# Patient Record
Sex: MALE | Race: WHITE | Employment: FULL TIME | URBAN - METROPOLITAN AREA
[De-identification: names, ages, dates, MRNs, and addresses within clinical notes are randomized per-mention and may not be internally consistent; named-entity substitution may affect disease eponyms.]

---

## 2017-12-01 ENCOUNTER — EKG ENCOUNTER (OUTPATIENT)
Dept: LAB | Age: 32
End: 2017-12-01
Attending: PHYSICIAN ASSISTANT
Payer: COMMERCIAL

## 2017-12-01 PROCEDURE — 93005 ELECTROCARDIOGRAM TRACING: CPT

## 2017-12-01 PROCEDURE — 93010 ELECTROCARDIOGRAM REPORT: CPT | Performed by: INTERNAL MEDICINE

## 2020-06-17 ENCOUNTER — OFFICE VISIT (OUTPATIENT)
Dept: FAMILY MEDICINE CLINIC | Facility: CLINIC | Age: 35
End: 2020-06-17
Payer: COMMERCIAL

## 2020-06-17 VITALS
HEIGHT: 72 IN | WEIGHT: 169.81 LBS | SYSTOLIC BLOOD PRESSURE: 160 MMHG | RESPIRATION RATE: 16 BRPM | HEART RATE: 83 BPM | TEMPERATURE: 98 F | DIASTOLIC BLOOD PRESSURE: 80 MMHG | BODY MASS INDEX: 23 KG/M2

## 2020-06-17 DIAGNOSIS — Z23 NEED FOR DIPHTHERIA-TETANUS-PERTUSSIS (TDAP) VACCINE: ICD-10-CM

## 2020-06-17 DIAGNOSIS — Z00.00 LABORATORY EXAM ORDERED AS PART OF ROUTINE GENERAL MEDICAL EXAMINATION: ICD-10-CM

## 2020-06-17 DIAGNOSIS — R56.9 ALCOHOL WITHDRAWAL SEIZURE WITHOUT COMPLICATION (HCC): ICD-10-CM

## 2020-06-17 DIAGNOSIS — F10.21 ALCOHOLISM IN REMISSION (HCC): ICD-10-CM

## 2020-06-17 DIAGNOSIS — Z00.00 ANNUAL PHYSICAL EXAM: Primary | ICD-10-CM

## 2020-06-17 DIAGNOSIS — F10.230 ALCOHOL WITHDRAWAL SEIZURE WITHOUT COMPLICATION (HCC): ICD-10-CM

## 2020-06-17 PROCEDURE — 99385 PREV VISIT NEW AGE 18-39: CPT | Performed by: FAMILY MEDICINE

## 2020-06-17 RX ORDER — LISDEXAMFETAMINE DIMESYLATE 30 MG/1
30 CAPSULE ORAL DAILY
COMMUNITY
Start: 2020-05-21

## 2020-06-17 NOTE — PROGRESS NOTES
Patient presents with:  Physical: Annual.   Lump: on Spine, tingling, on R side, feels inflammed.    Alcohol Problem: May pt had a seizure from alcohol withdrawl     HPI:   Timur Doevr is a 37year old male who presents for a complete physical exam. He History:  Social History    Tobacco Use      Smoking status: Never Smoker      Smokeless tobacco: Never Used    Alcohol use: Yes      Frequency: 4 or more times a week      Drinks per session: 10 or more      Binge frequency: Monthly      Comment: 10-15 : poor health when drinking. Need to cut alcohol out of life. New baby on the way, he believes this will motivate him to clean his act up. Will check labs. Lump on back benign. Cyst vs MSK vs lipoma.   Most likely lipoma based on feeling, and size and

## 2020-06-19 ENCOUNTER — LAB ENCOUNTER (OUTPATIENT)
Dept: LAB | Age: 35
End: 2020-06-19
Attending: FAMILY MEDICINE
Payer: COMMERCIAL

## 2020-06-19 DIAGNOSIS — F10.21 ALCOHOLISM IN REMISSION (HCC): ICD-10-CM

## 2020-06-19 DIAGNOSIS — Z00.00 LABORATORY EXAM ORDERED AS PART OF ROUTINE GENERAL MEDICAL EXAMINATION: ICD-10-CM

## 2020-06-19 PROCEDURE — 80050 GENERAL HEALTH PANEL: CPT | Performed by: FAMILY MEDICINE

## 2020-06-19 PROCEDURE — 84425 ASSAY OF VITAMIN B-1: CPT | Performed by: FAMILY MEDICINE

## 2020-06-19 PROCEDURE — 80061 LIPID PANEL: CPT | Performed by: FAMILY MEDICINE

## 2021-04-09 ENCOUNTER — TELEPHONE (OUTPATIENT)
Dept: FAMILY MEDICINE CLINIC | Facility: CLINIC | Age: 36
End: 2021-04-09

## 2021-04-09 NOTE — TELEPHONE ENCOUNTER
Called patient he is alcoholic and is going through withdrawals last drink was 20 min ago feels he needs seizure meds and aniexity medication.  He wants to talk to Dr Aashish Raygoza about this and to get medication

## 2021-04-09 NOTE — TELEPHONE ENCOUNTER
Pt is calling he would like to speak with Dr. Selin Goyal about personal stuff. He is looking for some personal advice he would not share.
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
## 2021-04-11 NOTE — ED PROVIDER NOTES
Patient Seen in: [facility] Emergency Department      History   Patient presents with:  Eval-D  Seizure Disorder    Stated Complaint: eval d, sz last night, last drink 40 hours ago    HPI/Subjective:   HPI    This is a pleasant 52-year-old male who Physical Exam  General: This a pleasant alert and oriented x3 patient. He does have some tremors noted. HEENT: Pupils are equal reactive to light. Extra ocular motions are intact.   No scleral icterus or conjunctival pallor: Tongue is midline and viral infection consistent with COVID-19 by their healthcare provider. Signs and symptoms of respiratory viral infection due to SARS-CoV-2, influenza, and RSV can be similar.     Test performed using the [employer] Xpress SARS-CoV-2/FLU/RSV assay on the [facility] The patient said he wished to contact his wife to discuss options. The patient did not wish to stay in the hospital or do any inpatient detox at this time. I will write Librium for the patient for home.   Told him to return immediately for any return o

## 2021-04-11 NOTE — PROGRESS NOTES
04/11/21 1420   COVID Exposure Risk Screening   Have you been practicing social distancing? Yes   Have you been wearing a mask when in the community? Yes   Are the people you live with following social distancing and wearing a mask?  Yes   While you are

## 2021-04-11 NOTE — ED INITIAL ASSESSMENT (HPI)
PT here for ETOH Detox. Pt reports that his last drink was 40 hours ago. Pt also reports that he had a seizure last night. Pt stated that he went to [facility] for detox and they sent him here for medical clearance.

## 2021-06-30 ENCOUNTER — TELEPHONE (OUTPATIENT)
Dept: FAMILY MEDICINE CLINIC | Facility: CLINIC | Age: 36
End: 2021-06-30

## 2021-06-30 NOTE — TELEPHONE ENCOUNTER
Please enter lab orders for the patient's upcoming physical appointment. Physical scheduled:    Your appointments     Date & Time Appointment Department [facility])    Jul 14, 2021  3:00 PM CDT Physical - Established with Igor Kirby MD [facility]

## 2021-06-30 NOTE — TELEPHONE ENCOUNTER
Patient was seen in the ER 04/11/21. He is an alcoholic and is currently going through withdrawal again. Patient is going to meetings and has the support of his wife.  When patient was discharged they gave him a 15 pill prescription of chlordiazePOXIDE HCl

## 2021-06-30 NOTE — PROGRESS NOTES
Patient presents with:  Alcohol Problem: withdrawl    French Rhodes is a 38year old male who presents for had concerns including Alcohol Problem (withdrawl). Encounter done via video visit    Objective:   HPI:   H/o alcoholism.    H/o three seizures sin coming up on 1year-old. We will prescribe the chlordiazepoxide. Labs ordered for upcoming physical.    Reminded patient of the dangers of alcohol withdrawal even with medications.   If he feels having breakthrough symptoms or seizures or overwhelmed, go

## 2021-06-30 NOTE — TELEPHONE ENCOUNTER
I would need some sort of encounter, its a controlled med  If he can come in at end of day, or do virtual we could discuss it.
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
## 2021-07-27 ENCOUNTER — LAB ENCOUNTER (OUTPATIENT)
Dept: LAB | Age: 36
End: 2021-07-27
Attending: FAMILY MEDICINE
Payer: COMMERCIAL

## 2021-07-27 DIAGNOSIS — R56.9 ALCOHOL WITHDRAWAL SEIZURE WITHOUT COMPLICATION (HCC): ICD-10-CM

## 2021-07-27 DIAGNOSIS — F10.230 ALCOHOL WITHDRAWAL SEIZURE WITHOUT COMPLICATION (HCC): ICD-10-CM

## 2021-07-27 DIAGNOSIS — Z00.00 LABORATORY EXAM ORDERED AS PART OF ROUTINE GENERAL MEDICAL EXAMINATION: ICD-10-CM

## 2021-07-27 LAB
ALBUMIN SERPL-MCNC: 4.2 G/DL (ref 3.4–5)
ALBUMIN/GLOB SERPL: 1.2 {RATIO} (ref 1–2)
ALP LIVER SERPL-CCNC: 87 U/L
ALT SERPL-CCNC: 350 U/L
ANION GAP SERPL CALC-SCNC: 5 MMOL/L (ref 0–18)
AST SERPL-CCNC: 238 U/L (ref 15–37)
BASOPHILS # BLD AUTO: 0.08 X10(3) UL (ref 0–0.2)
BASOPHILS NFR BLD AUTO: 1.8 %
BILIRUB SERPL-MCNC: 0.7 MG/DL (ref 0.1–2)
BUN BLD-MCNC: 16 MG/DL (ref 7–18)
BUN/CREAT SERPL: 16.5 (ref 10–20)
CALCIUM BLD-MCNC: 9.1 MG/DL (ref 8.5–10.1)
CHLORIDE SERPL-SCNC: 104 MMOL/L (ref 98–112)
CHOLEST SMN-MCNC: 199 MG/DL (ref ?–200)
CO2 SERPL-SCNC: 27 MMOL/L (ref 21–32)
CREAT BLD-MCNC: 0.97 MG/DL
DEPRECATED RDW RBC AUTO: 46 FL (ref 35.1–46.3)
EOSINOPHIL # BLD AUTO: 0.1 X10(3) UL (ref 0–0.7)
EOSINOPHIL NFR BLD AUTO: 2.2 %
ERYTHROCYTE [DISTWIDTH] IN BLOOD BY AUTOMATED COUNT: 12.2 % (ref 11–15)
EST. AVERAGE GLUCOSE BLD GHB EST-MCNC: 103 MG/DL (ref 68–126)
GLOBULIN PLAS-MCNC: 3.4 G/DL (ref 2.8–4.4)
GLUCOSE BLD-MCNC: 90 MG/DL (ref 70–99)
HBA1C MFR BLD HPLC: 5.2 % (ref ?–5.7)
HCT VFR BLD AUTO: 45.1 %
HDLC SERPL-MCNC: 81 MG/DL (ref 40–59)
HGB BLD-MCNC: 14.4 G/DL
IMM GRANULOCYTES # BLD AUTO: 0.01 X10(3) UL (ref 0–1)
IMM GRANULOCYTES NFR BLD: 0.2 %
LDLC SERPL CALC-MCNC: 107 MG/DL (ref ?–100)
LYMPHOCYTES # BLD AUTO: 1.68 X10(3) UL (ref 1–4)
LYMPHOCYTES NFR BLD AUTO: 37.8 %
M PROTEIN MFR SERPL ELPH: 7.6 G/DL (ref 6.4–8.2)
MCH RBC QN AUTO: 32.1 PG (ref 26–34)
MCHC RBC AUTO-ENTMCNC: 31.9 G/DL (ref 31–37)
MCV RBC AUTO: 100.4 FL
MONOCYTES # BLD AUTO: 0.62 X10(3) UL (ref 0.1–1)
MONOCYTES NFR BLD AUTO: 13.9 %
NEUTROPHILS # BLD AUTO: 1.96 X10 (3) UL (ref 1.5–7.7)
NEUTROPHILS # BLD AUTO: 1.96 X10(3) UL (ref 1.5–7.7)
NEUTROPHILS NFR BLD AUTO: 44.1 %
NONHDLC SERPL-MCNC: 118 MG/DL (ref ?–130)
OSMOLALITY SERPL CALC.SUM OF ELEC: 283 MOSM/KG (ref 275–295)
PATIENT FASTING Y/N/NP: YES
PATIENT FASTING Y/N/NP: YES
PLATELET # BLD AUTO: 294 10(3)UL (ref 150–450)
POTASSIUM SERPL-SCNC: 4.1 MMOL/L (ref 3.5–5.1)
RBC # BLD AUTO: 4.49 X10(6)UL
SODIUM SERPL-SCNC: 136 MMOL/L (ref 136–145)
TRIGL SERPL-MCNC: 59 MG/DL (ref 30–149)
TSI SER-ACNC: 1.15 MIU/ML (ref 0.36–3.74)
VLDLC SERPL CALC-MCNC: 10 MG/DL (ref 0–30)
WBC # BLD AUTO: 4.5 X10(3) UL (ref 4–11)

## 2021-07-27 PROCEDURE — 80061 LIPID PANEL: CPT | Performed by: FAMILY MEDICINE

## 2021-07-27 PROCEDURE — 84425 ASSAY OF VITAMIN B-1: CPT | Performed by: FAMILY MEDICINE

## 2021-07-27 PROCEDURE — 80050 GENERAL HEALTH PANEL: CPT | Performed by: FAMILY MEDICINE

## 2021-07-27 PROCEDURE — 83036 HEMOGLOBIN GLYCOSYLATED A1C: CPT | Performed by: FAMILY MEDICINE

## 2021-07-30 ENCOUNTER — OFFICE VISIT (OUTPATIENT)
Dept: FAMILY MEDICINE CLINIC | Facility: CLINIC | Age: 36
End: 2021-07-30
Payer: COMMERCIAL

## 2021-07-30 VITALS
HEIGHT: 73 IN | BODY MASS INDEX: 22.8 KG/M2 | TEMPERATURE: 98 F | SYSTOLIC BLOOD PRESSURE: 132 MMHG | DIASTOLIC BLOOD PRESSURE: 88 MMHG | RESPIRATION RATE: 16 BRPM | WEIGHT: 172 LBS | HEART RATE: 84 BPM

## 2021-07-30 DIAGNOSIS — R79.89 ELEVATED LFTS: Primary | ICD-10-CM

## 2021-07-30 DIAGNOSIS — F10.230 ALCOHOL WITHDRAWAL SYNDROME WITHOUT COMPLICATION (HCC): ICD-10-CM

## 2021-07-30 PROCEDURE — 3075F SYST BP GE 130 - 139MM HG: CPT | Performed by: NURSE PRACTITIONER

## 2021-07-30 PROCEDURE — 99214 OFFICE O/P EST MOD 30 MIN: CPT | Performed by: NURSE PRACTITIONER

## 2021-07-30 PROCEDURE — 3008F BODY MASS INDEX DOCD: CPT | Performed by: NURSE PRACTITIONER

## 2021-07-30 PROCEDURE — 3079F DIAST BP 80-89 MM HG: CPT | Performed by: NURSE PRACTITIONER

## 2021-07-30 NOTE — PROGRESS NOTES
Patient presents with:  Lab Results: follow up on blood work       HPI:  Presents for follow up of visit on 7/23 where he was starting acute alcohol withdrawal an also for follow up of labs. Please see note from visit on 7/23 for more detail.  Patient refus or appreciable organomegaly. BS(+)x4- normoactive. Skin: Skin is warm and dry. No rash noted. No erythema. No pallor. Psych: Sitting calmly in exam room, interacting appropriately with examiner. Dressed appropriately for the weather.      A/P:    Elevate

## 2021-08-01 LAB — VITAMIN B1 (THIAMINE), WHOLE B: 173 NMOL/L

## 2021-11-14 ENCOUNTER — IMMUNIZATION (OUTPATIENT)
Dept: LAB | Facility: HOSPITAL | Age: 36
End: 2021-11-14
Attending: EMERGENCY MEDICINE
Payer: COMMERCIAL

## 2021-11-14 DIAGNOSIS — Z23 NEED FOR VACCINATION: Primary | ICD-10-CM

## 2021-11-14 PROCEDURE — 0001A SARSCOV2 VAC 30MCG/0.3ML IM: CPT

## 2022-06-08 ENCOUNTER — TELEPHONE (OUTPATIENT)
Dept: FAMILY MEDICINE CLINIC | Facility: CLINIC | Age: 37
End: 2022-06-08

## 2022-06-08 NOTE — TELEPHONE ENCOUNTER
Spoke with patient. He reports he is an alcoholic and is currently going through detox. Had consumed 20 beers last night. Can still feel it in his system but he is calming down. Does have the shakes and is very anxious. He has had seizures in the past from withdrawals and does not want this to occur again. Requesting medication that was last ordered for this in July from [facility] as it did help him before. Advised that if he ever felt his symptoms were an emergency he was to go to the ER for evaluation. [facility] is not currently in office to request medication and did advise patient that it is unlikely this would be refilled although he has an upcoming appointment. Patient asks what he is supposed to do now to save his life? This RN strongly suggested he go to the ER and to follow up with this office as scheduled. Then he asks who he should call to save his life and I instructed him to call 911. Patient did sound anxious but had been speaking clearly during the call. Patient did sound frustrated that this office could not provide the medication immediately to him. Call was ended by patient at the time.

## 2022-06-08 NOTE — TELEPHONE ENCOUNTER
Patient is requesting the medication chlordiazepoxide HCI 25 mg please send to [facility] patient made a physical appt with Dr. Doris Toribio on 6/21/22he keeps stating he speaks with Nurse Sofia Quinn to get this done and this is a borderline emergency.

## 2023-06-28 ENCOUNTER — TELEPHONE (OUTPATIENT)
Dept: FAMILY MEDICINE CLINIC | Facility: CLINIC | Age: 38
End: 2023-06-28

## 2023-06-28 RX ORDER — ONDANSETRON 4 MG/1
1 TABLET, FILM COATED ORAL EVERY 6 HOURS PRN
COMMUNITY
Start: 2022-12-31

## 2023-06-28 RX ORDER — LORAZEPAM 1 MG/1
1 TABLET ORAL EVERY 4 HOURS PRN
COMMUNITY
Start: 2022-12-31

## 2023-06-28 RX ORDER — CHLORDIAZEPOXIDE HYDROCHLORIDE 10 MG/1
10 CAPSULE, GELATIN COATED ORAL 3 TIMES DAILY PRN
COMMUNITY
Start: 2021-12-14

## 2023-06-28 RX ORDER — FAMOTIDINE 20 MG/1
20 TABLET, FILM COATED ORAL 2 TIMES DAILY
COMMUNITY
Start: 2022-12-31

## 2023-11-06 ENCOUNTER — TELEPHONE (OUTPATIENT)
Dept: FAMILY MEDICINE CLINIC | Facility: CLINIC | Age: 38
End: 2023-11-06

## 2023-11-06 DIAGNOSIS — Z00.00 LABORATORY EXAM ORDERED AS PART OF ROUTINE GENERAL MEDICAL EXAMINATION: Primary | ICD-10-CM

## 2023-11-06 NOTE — TELEPHONE ENCOUNTER
Please enter lab orders for the patient's upcoming physical appointment. Physical scheduled: Your appointments       Date & Time Appointment Department [facility])    Nov 28, 2023 10:00 AM CST Physical - Established with Mariela Pavon MD [address], [address], [place] ([address])              Malika Ness [address] [facility] [phone]           Preferred lab: [facility])     The patient has been notified to complete fasting labs prior to their physical appointment.

## 2023-12-22 ENCOUNTER — LAB ENCOUNTER (OUTPATIENT)
Dept: LAB | Age: 38
End: 2023-12-22
Attending: FAMILY MEDICINE
Payer: COMMERCIAL

## 2023-12-22 DIAGNOSIS — Z00.00 LABORATORY EXAM ORDERED AS PART OF ROUTINE GENERAL MEDICAL EXAMINATION: ICD-10-CM

## 2023-12-22 LAB
ALBUMIN SERPL-MCNC: 4.3 G/DL (ref 3.4–5)
ALBUMIN/GLOB SERPL: 1.3 {RATIO} (ref 1–2)
ALP LIVER SERPL-CCNC: 106 U/L
ALT SERPL-CCNC: 1234 U/L
ANION GAP SERPL CALC-SCNC: 3 MMOL/L (ref 0–18)
AST SERPL-CCNC: 650 U/L (ref 15–37)
BILIRUB SERPL-MCNC: 1.1 MG/DL (ref 0.1–2)
BUN BLD-MCNC: 13 MG/DL (ref 9–23)
CALCIUM BLD-MCNC: 9.4 MG/DL (ref 8.5–10.1)
CHLORIDE SERPL-SCNC: 103 MMOL/L (ref 98–112)
CHOLEST SERPL-MCNC: 178 MG/DL (ref ?–200)
CO2 SERPL-SCNC: 30 MMOL/L (ref 21–32)
CREAT BLD-MCNC: 1 MG/DL
EGFRCR SERPLBLD CKD-EPI 2021: 98 ML/MIN/1.73M2 (ref 60–?)
ERYTHROCYTE [DISTWIDTH] IN BLOOD BY AUTOMATED COUNT: 12 %
FASTING PATIENT LIPID ANSWER: YES
FASTING STATUS PATIENT QL REPORTED: YES
GLOBULIN PLAS-MCNC: 3.4 G/DL (ref 2.8–4.4)
GLUCOSE BLD-MCNC: 99 MG/DL (ref 70–99)
HCT VFR BLD AUTO: 43.3 %
HDLC SERPL-MCNC: 76 MG/DL (ref 40–59)
HGB BLD-MCNC: 14 G/DL
LDLC SERPL CALC-MCNC: 89 MG/DL (ref ?–100)
MCH RBC QN AUTO: 32.4 PG (ref 26–34)
MCHC RBC AUTO-ENTMCNC: 32.3 G/DL (ref 31–37)
MCV RBC AUTO: 100.2 FL
NONHDLC SERPL-MCNC: 102 MG/DL (ref ?–130)
OSMOLALITY SERPL CALC.SUM OF ELEC: 282 MOSM/KG (ref 275–295)
PLATELET # BLD AUTO: 202 10(3)UL (ref 150–450)
POTASSIUM SERPL-SCNC: 4.2 MMOL/L (ref 3.5–5.1)
PROT SERPL-MCNC: 7.7 G/DL (ref 6.4–8.2)
RBC # BLD AUTO: 4.32 X10(6)UL
SODIUM SERPL-SCNC: 136 MMOL/L (ref 136–145)
TRIGL SERPL-MCNC: 69 MG/DL (ref 30–149)
TSI SER-ACNC: 1.14 MIU/ML (ref 0.36–3.74)
VLDLC SERPL CALC-MCNC: 11 MG/DL (ref 0–30)
WBC # BLD AUTO: 4.3 X10(3) UL (ref 4–11)

## 2023-12-22 PROCEDURE — 80053 COMPREHEN METABOLIC PANEL: CPT

## 2023-12-22 PROCEDURE — 80061 LIPID PANEL: CPT

## 2023-12-22 PROCEDURE — 84443 ASSAY THYROID STIM HORMONE: CPT

## 2023-12-22 PROCEDURE — 85027 COMPLETE CBC AUTOMATED: CPT

## 2023-12-28 ENCOUNTER — OFFICE VISIT (OUTPATIENT)
Dept: FAMILY MEDICINE CLINIC | Facility: CLINIC | Age: 38
End: 2023-12-28
Payer: COMMERCIAL

## 2023-12-28 VITALS
OXYGEN SATURATION: 98 % | BODY MASS INDEX: 22.53 KG/M2 | SYSTOLIC BLOOD PRESSURE: 124 MMHG | HEART RATE: 110 BPM | DIASTOLIC BLOOD PRESSURE: 78 MMHG | HEIGHT: 73 IN | RESPIRATION RATE: 16 BRPM | WEIGHT: 170 LBS

## 2023-12-28 DIAGNOSIS — Z00.00 ANNUAL PHYSICAL EXAM: Primary | ICD-10-CM

## 2023-12-28 DIAGNOSIS — R74.8 ELEVATED LIVER ENZYMES: ICD-10-CM

## 2023-12-28 DIAGNOSIS — F10.20 ALCOHOLISM (HCC): ICD-10-CM

## 2023-12-28 PROCEDURE — 3078F DIAST BP <80 MM HG: CPT | Performed by: FAMILY MEDICINE

## 2023-12-28 PROCEDURE — 3074F SYST BP LT 130 MM HG: CPT | Performed by: FAMILY MEDICINE

## 2023-12-28 PROCEDURE — 99396 PREV VISIT EST AGE 40-64: CPT | Performed by: FAMILY MEDICINE

## 2023-12-28 PROCEDURE — 3008F BODY MASS INDEX DOCD: CPT | Performed by: FAMILY MEDICINE

## 2023-12-28 RX ORDER — CHLORDIAZEPOXIDE HYDROCHLORIDE 10 MG/1
10 CAPSULE, GELATIN COATED ORAL 3 TIMES DAILY PRN
Qty: 20 CAPSULE | Refills: 0 | Status: SHIPPED | OUTPATIENT
Start: 2023-12-28

## 2024-03-08 ENCOUNTER — LAB ENCOUNTER (OUTPATIENT)
Dept: LAB | Age: 39
End: 2024-03-08
Attending: FAMILY MEDICINE
Payer: COMMERCIAL

## 2024-03-08 DIAGNOSIS — F10.20 ALCOHOLISM (HCC): ICD-10-CM

## 2024-04-04 DIAGNOSIS — F10.20 ALCOHOLISM (HCC): ICD-10-CM

## 2024-04-04 NOTE — TELEPHONE ENCOUNTER
chlordiazePOXIDE 10 MG Oral Cap patient is out of the medication please refill to [facility]

## 2024-05-13 RX ORDER — CHLORDIAZEPOXIDE HYDROCHLORIDE 10 MG/1
10 CAPSULE, GELATIN COATED ORAL 3 TIMES DAILY PRN
Qty: 20 CAPSULE | Refills: 0 | OUTPATIENT
Start: 2024-05-13

## 2024-10-15 ENCOUNTER — LABORATORY ENCOUNTER (OUTPATIENT)
Dept: LAB | Age: 39
End: 2024-10-15
Attending: FAMILY MEDICINE
Payer: COMMERCIAL

## 2024-10-15 DIAGNOSIS — F10.20 ALCOHOLISM (HCC): ICD-10-CM

## 2024-10-15 LAB
ALBUMIN SERPL-MCNC: 5.2 G/DL (ref 3.2–4.8)
ALBUMIN/GLOB SERPL: 2.1 {RATIO} (ref 1–2)
ALP LIVER SERPL-CCNC: 75 U/L
ALT SERPL-CCNC: 260 U/L
ANION GAP SERPL CALC-SCNC: 8 MMOL/L (ref 0–18)
AST SERPL-CCNC: 94 U/L (ref ?–34)
BILIRUB SERPL-MCNC: 0.4 MG/DL (ref 0.3–1.2)
BUN BLD-MCNC: 15 MG/DL (ref 9–23)
CALCIUM BLD-MCNC: 10.6 MG/DL (ref 8.7–10.4)
CHLORIDE SERPL-SCNC: 103 MMOL/L (ref 98–112)
CO2 SERPL-SCNC: 28 MMOL/L (ref 21–32)
CREAT BLD-MCNC: 1.03 MG/DL
EGFRCR SERPLBLD CKD-EPI 2021: 94 ML/MIN/1.73M2 (ref 60–?)
FASTING STATUS PATIENT QL REPORTED: YES
GLOBULIN PLAS-MCNC: 2.5 G/DL (ref 2–3.5)
GLUCOSE BLD-MCNC: 99 MG/DL (ref 70–99)
OSMOLALITY SERPL CALC.SUM OF ELEC: 289 MOSM/KG (ref 275–295)
POTASSIUM SERPL-SCNC: 4.1 MMOL/L (ref 3.5–5.1)
PROT SERPL-MCNC: 7.7 G/DL (ref 5.7–8.2)
SODIUM SERPL-SCNC: 139 MMOL/L (ref 136–145)

## 2024-10-15 PROCEDURE — 80053 COMPREHEN METABOLIC PANEL: CPT | Performed by: FAMILY MEDICINE

## 2025-04-21 ENCOUNTER — TELEPHONE (OUTPATIENT)
Dept: FAMILY MEDICINE CLINIC | Facility: CLINIC | Age: 40
End: 2025-04-21

## 2025-04-21 DIAGNOSIS — Z00.00 LABORATORY EXAM ORDERED AS PART OF ROUTINE GENERAL MEDICAL EXAMINATION: Primary | ICD-10-CM

## 2025-04-21 NOTE — TELEPHONE ENCOUNTER
Please enter lab orders for the patient's upcoming physical appointment.     Physical scheduled:   Your appointments       Date & Time Appointment Department ([place])    May 12, 2025 11:30 AM CDT Adult Physical with Mayo Silva MD [facility] ([facility])    PLEASE NOTE - Most insurances allow a Complete Physical once every 366 days. Please schedule accordingly.    Please arrive 15 minutes prior to your scheduled appointment. Please also bring your Insurance card, Photo ID, and your medication bottles or a list of your current medication.    If you no longer require this appointment, please contact your physician office to cancel.              [facility]  [address] [address]  [phone]           Preferred lab: [facility] ([facility])     The patient has been notified to complete fasting labs prior to their physical appointment.

## 2025-05-08 ENCOUNTER — LABORATORY ENCOUNTER (OUTPATIENT)
Dept: LAB | Age: 40
End: 2025-05-08
Attending: FAMILY MEDICINE
Payer: COMMERCIAL

## 2025-05-08 DIAGNOSIS — Z00.00 LABORATORY EXAM ORDERED AS PART OF ROUTINE GENERAL MEDICAL EXAMINATION: ICD-10-CM

## 2025-05-08 LAB
ALBUMIN SERPL-MCNC: 4.5 G/DL (ref 3.2–4.8)
ALBUMIN/GLOB SERPL: 2 {RATIO} (ref 1–2)
ALP LIVER SERPL-CCNC: 75 U/L (ref 45–117)
ALT SERPL-CCNC: 53 U/L (ref 10–49)
ANION GAP SERPL CALC-SCNC: 7 MMOL/L (ref 0–18)
AST SERPL-CCNC: 44 U/L (ref ?–34)
BASOPHILS # BLD AUTO: 0.04 X10(3) UL (ref 0–0.2)
BASOPHILS NFR BLD AUTO: 0.8 %
BILIRUB SERPL-MCNC: 0.5 MG/DL (ref 0.3–1.2)
BUN BLD-MCNC: 17 MG/DL (ref 9–23)
CALCIUM BLD-MCNC: 9.7 MG/DL (ref 8.7–10.6)
CHLORIDE SERPL-SCNC: 103 MMOL/L (ref 98–112)
CHOLEST SERPL-MCNC: 150 MG/DL (ref ?–200)
CO2 SERPL-SCNC: 30 MMOL/L (ref 21–32)
CREAT BLD-MCNC: 1.13 MG/DL (ref 0.7–1.3)
EGFRCR SERPLBLD CKD-EPI 2021: 84 ML/MIN/1.73M2 (ref 60–?)
EOSINOPHIL # BLD AUTO: 0.12 X10(3) UL (ref 0–0.7)
EOSINOPHIL NFR BLD AUTO: 2.5 %
ERYTHROCYTE [DISTWIDTH] IN BLOOD BY AUTOMATED COUNT: 12.1 %
EST. AVERAGE GLUCOSE BLD GHB EST-MCNC: 105 MG/DL (ref 68–126)
FASTING PATIENT LIPID ANSWER: YES
FASTING STATUS PATIENT QL REPORTED: YES
GLOBULIN PLAS-MCNC: 2.3 G/DL (ref 2–3.5)
GLUCOSE BLD-MCNC: 98 MG/DL (ref 70–99)
HBA1C MFR BLD: 5.3 % (ref ?–5.7)
HCT VFR BLD AUTO: 44.7 % (ref 39–53)
HDLC SERPL-MCNC: 57 MG/DL (ref 40–59)
HGB BLD-MCNC: 14.5 G/DL (ref 13–17.5)
IMM GRANULOCYTES # BLD AUTO: 0.01 X10(3) UL (ref 0–1)
IMM GRANULOCYTES NFR BLD: 0.2 %
LDLC SERPL CALC-MCNC: 79 MG/DL (ref ?–100)
LYMPHOCYTES # BLD AUTO: 1.93 X10(3) UL (ref 1–4)
LYMPHOCYTES NFR BLD AUTO: 39.7 %
MCH RBC QN AUTO: 30.1 PG (ref 26–34)
MCHC RBC AUTO-ENTMCNC: 32.4 G/DL (ref 31–37)
MCV RBC AUTO: 92.9 FL (ref 80–100)
MONOCYTES # BLD AUTO: 0.48 X10(3) UL (ref 0.1–1)
MONOCYTES NFR BLD AUTO: 9.9 %
NEUTROPHILS # BLD AUTO: 2.28 X10 (3) UL (ref 1.5–7.7)
NEUTROPHILS # BLD AUTO: 2.28 X10(3) UL (ref 1.5–7.7)
NEUTROPHILS NFR BLD AUTO: 46.9 %
NONHDLC SERPL-MCNC: 93 MG/DL (ref ?–130)
OSMOLALITY SERPL CALC.SUM OF ELEC: 292 MOSM/KG (ref 275–295)
PLATELET # BLD AUTO: 263 10(3)UL (ref 150–450)
POTASSIUM SERPL-SCNC: 4.3 MMOL/L (ref 3.5–5.1)
PROT SERPL-MCNC: 6.8 G/DL (ref 5.7–8.2)
RBC # BLD AUTO: 4.81 X10(6)UL (ref 4.3–5.7)
SODIUM SERPL-SCNC: 140 MMOL/L (ref 136–145)
TRIGL SERPL-MCNC: 72 MG/DL (ref 30–149)
TSI SER-ACNC: 1.23 UIU/ML (ref 0.55–4.78)
VLDLC SERPL CALC-MCNC: 11 MG/DL (ref 0–30)
WBC # BLD AUTO: 4.9 X10(3) UL (ref 4–11)

## 2025-05-08 PROCEDURE — 80061 LIPID PANEL: CPT | Performed by: FAMILY MEDICINE

## 2025-05-08 PROCEDURE — 80050 GENERAL HEALTH PANEL: CPT | Performed by: FAMILY MEDICINE

## 2025-05-08 PROCEDURE — 83036 HEMOGLOBIN GLYCOSYLATED A1C: CPT | Performed by: FAMILY MEDICINE

## 2025-05-12 ENCOUNTER — OFFICE VISIT (OUTPATIENT)
Dept: FAMILY MEDICINE CLINIC | Facility: CLINIC | Age: 40
End: 2025-05-12
Payer: COMMERCIAL

## 2025-05-12 VITALS
WEIGHT: 194 LBS | BODY MASS INDEX: 25.71 KG/M2 | HEART RATE: 66 BPM | HEIGHT: 73 IN | OXYGEN SATURATION: 96 % | SYSTOLIC BLOOD PRESSURE: 120 MMHG | RESPIRATION RATE: 16 BRPM | DIASTOLIC BLOOD PRESSURE: 74 MMHG

## 2025-05-12 DIAGNOSIS — Z00.00 ANNUAL PHYSICAL EXAM: Primary | ICD-10-CM

## 2025-05-12 DIAGNOSIS — G89.29 CHRONIC PAIN OF BOTH KNEES: ICD-10-CM

## 2025-05-12 DIAGNOSIS — R23.9 SKIN CHANGE: ICD-10-CM

## 2025-05-12 DIAGNOSIS — M25.562 CHRONIC PAIN OF BOTH KNEES: ICD-10-CM

## 2025-05-12 DIAGNOSIS — M25.561 CHRONIC PAIN OF BOTH KNEES: ICD-10-CM

## 2025-05-12 RX ORDER — LISDEXAMFETAMINE DIMESYLATE 30 MG/1
30 CAPSULE ORAL DAILY
COMMUNITY
Start: 2025-03-14

## 2025-05-12 RX ORDER — ESCITALOPRAM OXALATE 10 MG/1
15 TABLET ORAL DAILY
COMMUNITY
Start: 2025-05-08

## 2025-05-12 NOTE — PROGRESS NOTES
Chief Complaint   Patient presents with    Physical     Patient here for physical       HPI:   Roddy Angel is a 39 year old male who presents for a complete physical exam.     Last colonoscopy:  N/a - screening at 42yo   Last PSA:  N/a - screening at 48   Immunizations: TDaP 2024.      Knees - pain in both knees.  Sprains over the years.  Now creaking and cracking.  Can still run, and do weights.  But there are times where they do limit him.      Alcoholism - no alcohol for 50 days.  Feels better.  Liver much improved.     Weight - up 25lbs.  Eating healthy, exercise.  Also on lexapro.    Wt Readings from Last 6 Encounters:   05/12/25 194 lb (88 kg)   12/28/23 170 lb (77.1 kg)   07/30/21 172 lb (78 kg)   07/23/21 168 lb 6 oz (76.4 kg)   04/11/21 165 lb (74.8 kg)   06/17/20 169 lb 12.8 oz (77 kg)     Body mass index is 25.6 kg/m².     Chemistry Labs:   Lab Results   Component Value Date/Time    GLU 98 05/08/2025 08:16 AM     05/08/2025 08:16 AM    K 4.3 05/08/2025 08:16 AM     05/08/2025 08:16 AM    CO2 30.0 05/08/2025 08:16 AM    CREATSERUM 1.13 05/08/2025 08:16 AM    CA 9.7 05/08/2025 08:16 AM    ALB 4.5 05/08/2025 08:16 AM    TP 6.8 05/08/2025 08:16 AM    ALKPHO 75 05/08/2025 08:16 AM    AST 44 (H) 05/08/2025 08:16 AM    ALT 53 (H) 05/08/2025 08:16 AM    BILT 0.5 05/08/2025 08:16 AM          Cholesterol  (most recent labs)   Lab Results   Component Value Date/Time    CHOLEST 150 05/08/2025 08:16 AM    HDL 57 05/08/2025 08:16 AM    LDL 79 05/08/2025 08:16 AM    TRIG 72 05/08/2025 08:16 AM      No results found for: \"PSA\"      Current Medications[1]   Past Medical History[2]   Past Surgical History[3]   Family History[4]   Social History:  Short Social Hx on File[5]     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   : yes. Children: 6yo son.   Exercise: regular exercise.  Cardio and weights.   Diet: healthy food choices.      REVIEW OF SYSTEMS:     All systems reviewed, negative other than  noted above.    EXAM:   /74   Pulse 66   Resp 16   Ht 6' 1\" (1.854 m)   Wt 194 lb (88 kg)   SpO2 96%   BMI 25.60 kg/m²   Body mass index is 25.6 kg/m².     General appearance: alert, appears stated age and cooperative  Eyes: conjunctivae/corneas clear. PERRL, EOM's intact.   Ears: normal TM's and external ear canals both ears. Cerumen removed from R ear.   Neck: no adenopathy, no JVD, supple, symmetrical, trachea midline and thyroid not enlarged, symmetric, no tenderness/mass/nodules  Lungs: clear to auscultation bilaterally  Heart: S1, S2 normal, no murmur, click, rub or gallop, regular rate and rhythm  Abdomen: soft, non-tender; bowel sounds normal; no masses,  no organomegaly  Extremities: extremities normal, atraumatic, no cyanosis or edema  Pulses: 2+ and symmetric  Neurologic: Alert and oriented X 3, normal strength and tone. Normal symmetric reflexes. Normal coordination and gait     ASSESSMENT AND PLAN:     Roddy Angel was seen in the office today:  had concerns including Physical (Patient here for physical ).    1. Annual physical exam  Overall well  History of alcoholism, now in remission 50 days.  Liver significantly improved from this.  Still mild inflammation but we will watch this over time  No indication for colon screening, due at 43.  PSA at 48  Immunizations reviewed, up-to-date    2. Chronic pain of both knees  Likely patellofemoral pain  Will refer to orthopedic doctor for more in-depth evaluation given the chronic pains and limitations  Exercises given the patient in the [place] [facility]  - ORTHOPEDIC - INTERNAL    3. Skin change  Hoping to see dermatologist.  Referral placed.  He is fair skinned, with high sun exposure  - DERM - INTERNAL          Mayo Silva M.D.   EMG 3  05/12/25        Note to patient: The 21 Century Cures Act makes medical notes like these available to patients in the interest of transparency. However, be advised this is a medical  document. It is intended as peer to peer communication. It is written in medical language and may contain abbreviations or verbiage that are unfamiliar. It may appear blunt or direct. Medical documents are intended to carry relevant information, facts as evident, and the clinical opinion of the practitioner.            [1]   Current Outpatient Medications   Medication Sig Dispense Refill    escitalopram 10 MG Oral Tab Take 1.5 tablets (15 mg total) by mouth daily.      VYVANSE 30 MG Oral Cap Take 1 capsule (30 mg total) by mouth daily.      chlordiazePOXIDE 10 MG Oral Cap Take 1 capsule (10 mg total) by mouth 3 (three) times daily as needed for Anxiety (withdrawl). 20 capsule 0   [2]   Past Medical History:   ETOH abuse    Seizure disorder (HCC)   [3]   Past Surgical History:  Procedure Laterality Date    Patient denies any surgical history     [4]   Family History  Problem Relation Age of Onset    Cancer Mother         lymphoma    Other (Other) Mother         rheumatoid arthritis    Alcohol and Other Disorders Associated Maternal Grandfather     No Known Problems Paternal Grandmother     Alcohol and Other Disorders Associated Brother    [5]   Social History  Socioeconomic History    Marital status:    Tobacco Use    Smoking status: Never    Smokeless tobacco: Never   Vaping Use    Vaping status: Never Used   Substance and Sexual Activity    Alcohol use: Not Currently     Comment: 10-15 : per pt he is an alcoholic stopped on 7/22/21    Drug use: Yes     Types: Cannabis     Comment: Weed. Daily     Sexual activity: Yes   Other Topics Concern    Caffeine Concern Yes     Comment: cup a coffee a day     Stress Concern Yes    Weight Concern No    Special Diet No    Exercise Yes     Comment: 4-5 times a week    Seat Belt Yes     Social Drivers of Health     Food Insecurity: No Food Insecurity (8/23/2024)    Received from [facility]    Food Insecurity     Currently or in the past 3 months, have  you worried your food would run out before you had money to buy more?: No     In the past 12 months, have you run out of food or been unable to get more?: No   Transportation Needs: No Transportation Needs (8/23/2024)    Received from [facility]    Transportation Needs     Currently or in the past 3 months, has lack of transportation kept you from medical appointments, getting food or medicine, or providing care to a family member?: No     Medical Transportation Needs?: No    Received from [facility]    Housing Stability

## 2025-06-25 ENCOUNTER — TELEPHONE (OUTPATIENT)
Facility: CLINIC | Age: 40
End: 2025-06-25

## 2025-06-25 DIAGNOSIS — M25.561 ACUTE PAIN OF BOTH KNEES: Primary | ICD-10-CM

## 2025-06-25 DIAGNOSIS — M25.562 ACUTE PAIN OF BOTH KNEES: Primary | ICD-10-CM

## 2025-06-25 NOTE — TELEPHONE ENCOUNTER
Xray ordered per Ortho protocol, Xray scheduled.  [employer] message sent to patient to arrive 15 - 20 min early to complete imaging.

## 2025-06-26 ENCOUNTER — HOSPITAL ENCOUNTER (OUTPATIENT)
Dept: GENERAL RADIOLOGY | Age: 40
Discharge: HOME | End: 2025-06-26
Attending: FAMILY MEDICINE
Payer: COMMERCIAL

## 2025-06-26 ENCOUNTER — OFFICE VISIT (OUTPATIENT)
Dept: ORTHOPEDICS CLINIC | Facility: CLINIC | Age: 40
End: 2025-06-26
Payer: COMMERCIAL

## 2025-06-26 VITALS — HEIGHT: 73 IN | BODY MASS INDEX: 25.71 KG/M2 | WEIGHT: 194 LBS

## 2025-06-26 DIAGNOSIS — M25.561 ACUTE PAIN OF BOTH KNEES: ICD-10-CM

## 2025-06-26 DIAGNOSIS — M25.562 ACUTE PAIN OF BOTH KNEES: ICD-10-CM

## 2025-06-26 DIAGNOSIS — M22.2X1 PATELLOFEMORAL PAIN SYNDROME OF BOTH KNEES: Primary | ICD-10-CM

## 2025-06-26 DIAGNOSIS — M22.2X2 PATELLOFEMORAL PAIN SYNDROME OF BOTH KNEES: Primary | ICD-10-CM

## 2025-06-26 PROCEDURE — 99204 OFFICE O/P NEW MOD 45 MIN: CPT | Performed by: FAMILY MEDICINE

## 2025-06-26 PROCEDURE — 73564 X-RAY EXAM KNEE 4 OR MORE: CPT | Performed by: FAMILY MEDICINE

## 2025-06-26 PROCEDURE — 3008F BODY MASS INDEX DOCD: CPT | Performed by: FAMILY MEDICINE

## 2025-06-30 NOTE — H&P
Sports Medicine Clinic Note    Subjective:    Chief Complaint: Bilateral anterior knee pain    History: 39-year-old male presents with chronic anterior knee pain, more pronounced in the right knee. The pain is provoked by deep squats and lunges, particularly when the back knee is engaged, with a “tweak” sensation noted. He reports occasional diffuse pain and discomfort when straightening the knee or activating the quadriceps. The right knee is described as more symptomatic, with audible noises during squatting. Patient has a history of multiple knee sprains and dislocations from past participation in soccer and basketball. He is currently participating in HIIT workouts and is cautious with physical activities due to pain. No consistent use of joint supplements, though he is familiar with several.    Objective:    Bilateral Knee Examination:    Inspection: No gross deformities, erythema, or swelling noted. No gait abnormalities observed.  Palpation: Mild tenderness noted over the patellofemoral joint bilaterally, right greater than left.  Range of Motion: Full active and passive range of motion bilaterally with mild discomfort during deep flexion.  Neurovascular: Intact distally with symmetric pulses and normal capillary refill. No sensory or motor deficits.  Special Tests: Negative Lachman, anterior drawer, and posterior drawer tests. No varus or valgus instability. Patellar grind test positive bilaterally. Slight lateral patellar tracking noted on right with active knee extension. No evidence of meniscal signs on Jolene or Thessaly tests.    Diagnostic Tests:    Radiographs of both knees were personally reviewed and demonstrate no fracture, dislocation, joint space narrowing, osteophyte formation, or chondrocalcinosis. No joint effusion was identified. Overall findings were unremarkable.    Assessment:    Suspected bilateral patellofemoral pain syndrome, right greater than left.    Plan:    Additional Workup:  Consider MRI of both knees if symptoms do not improve with conservative management.  Therapy: Referral to Physical Therapy for a 4-6 week program focusing on VMO and hip external rotator strengthening with an emphasis on movement retraining and patellofemoral mechanics.  Medications: NSAIDs as needed for pain and inflammation.  Bracing/Casting: Patellar tracking knee sleeve may be considered for support during high-impact activity.  Procedures: None at this time.  Activity Recommendations: Avoid squatting beyond 90 degrees and high-stress knee flexion activities. Focus on proper squat and lunge mechanics with gradual return to tolerated activities. Patient advised on weight management and optimizing form during workouts.    Follow-Up: Tentatively scheduled following completion of physical therapy to reassess progress and determine need for further imaging or intervention.      Samm Pina DO, CAQSM   Primary Care Sports Medicine